# Patient Record
Sex: MALE | Race: WHITE | NOT HISPANIC OR LATINO | Employment: OTHER | URBAN - METROPOLITAN AREA
[De-identification: names, ages, dates, MRNs, and addresses within clinical notes are randomized per-mention and may not be internally consistent; named-entity substitution may affect disease eponyms.]

---

## 2017-02-02 ENCOUNTER — GENERIC CONVERSION - ENCOUNTER (OUTPATIENT)
Dept: OTHER | Facility: OTHER | Age: 77
End: 2017-02-02

## 2017-02-16 ENCOUNTER — GENERIC CONVERSION - ENCOUNTER (OUTPATIENT)
Dept: OTHER | Facility: OTHER | Age: 77
End: 2017-02-16

## 2017-03-21 ENCOUNTER — ALLSCRIPTS OFFICE VISIT (OUTPATIENT)
Dept: OTHER | Facility: OTHER | Age: 77
End: 2017-03-21

## 2017-03-21 DIAGNOSIS — I10 ESSENTIAL (PRIMARY) HYPERTENSION: ICD-10-CM

## 2017-03-21 DIAGNOSIS — I48.20 CHRONIC ATRIAL FIBRILLATION (HCC): ICD-10-CM

## 2017-03-21 DIAGNOSIS — Z00.00 ENCOUNTER FOR GENERAL ADULT MEDICAL EXAMINATION WITHOUT ABNORMAL FINDINGS: ICD-10-CM

## 2017-03-21 DIAGNOSIS — E11.9 TYPE 2 DIABETES MELLITUS WITHOUT COMPLICATIONS (HCC): ICD-10-CM

## 2017-03-22 ENCOUNTER — TRANSCRIBE ORDERS (OUTPATIENT)
Dept: ADMINISTRATIVE | Facility: HOSPITAL | Age: 77
End: 2017-03-22

## 2017-03-22 DIAGNOSIS — R06.83 SNORES: Primary | ICD-10-CM

## 2017-03-28 ENCOUNTER — GENERIC CONVERSION - ENCOUNTER (OUTPATIENT)
Dept: OTHER | Facility: OTHER | Age: 77
End: 2017-03-28

## 2017-04-17 ENCOUNTER — HOSPITAL ENCOUNTER (OUTPATIENT)
Dept: NON INVASIVE DIAGNOSTICS | Facility: HOSPITAL | Age: 77
Discharge: HOME | End: 2017-04-17
Payer: MEDICARE

## 2017-04-17 DIAGNOSIS — E11.9 TYPE 2 DIABETES MELLITUS WITHOUT COMPLICATIONS (HCC): ICD-10-CM

## 2017-04-17 DIAGNOSIS — I10 ESSENTIAL (PRIMARY) HYPERTENSION: ICD-10-CM

## 2017-04-17 DIAGNOSIS — I48.20 CHRONIC ATRIAL FIBRILLATION (HCC): ICD-10-CM

## 2017-04-17 DIAGNOSIS — Z00.00 ENCOUNTER FOR GENERAL ADULT MEDICAL EXAMINATION WITHOUT ABNORMAL FINDINGS: ICD-10-CM

## 2017-04-17 PROCEDURE — 93225 XTRNL ECG REC<48 HRS REC: CPT

## 2017-04-17 PROCEDURE — 93226 XTRNL ECG REC<48 HR SCAN A/R: CPT

## 2017-04-18 ENCOUNTER — GENERIC CONVERSION - ENCOUNTER (OUTPATIENT)
Dept: OTHER | Facility: OTHER | Age: 77
End: 2017-04-18

## 2017-04-18 ENCOUNTER — HOSPITAL ENCOUNTER (OUTPATIENT)
Dept: SLEEP CENTER | Facility: CLINIC | Age: 77
Discharge: HOME | End: 2017-04-18
Payer: MEDICARE

## 2017-04-18 DIAGNOSIS — R06.83 SNORES: ICD-10-CM

## 2017-04-21 ENCOUNTER — ALLSCRIPTS OFFICE VISIT (OUTPATIENT)
Dept: OTHER | Facility: OTHER | Age: 77
End: 2017-04-21

## 2018-01-12 VITALS
SYSTOLIC BLOOD PRESSURE: 126 MMHG | HEART RATE: 100 BPM | BODY MASS INDEX: 27.77 KG/M2 | WEIGHT: 205.06 LBS | DIASTOLIC BLOOD PRESSURE: 78 MMHG | HEIGHT: 72 IN

## 2018-01-13 VITALS
BODY MASS INDEX: 27.94 KG/M2 | WEIGHT: 206.31 LBS | SYSTOLIC BLOOD PRESSURE: 122 MMHG | HEIGHT: 72 IN | HEART RATE: 78 BPM | DIASTOLIC BLOOD PRESSURE: 88 MMHG

## 2018-02-03 DIAGNOSIS — I48.91 A-FIB (HCC): Primary | ICD-10-CM

## 2018-05-15 RX ORDER — AMOXICILLIN 500 MG
CAPSULE ORAL
COMMUNITY
Start: 2016-04-19 | End: 2020-05-27 | Stop reason: ALTCHOICE

## 2018-05-15 RX ORDER — FENOFIBRATE 160 MG/1
1 TABLET ORAL DAILY
COMMUNITY
Start: 2017-04-21

## 2018-05-15 RX ORDER — LISINOPRIL 5 MG/1
1 TABLET ORAL DAILY
COMMUNITY
Start: 2016-04-19

## 2018-05-15 RX ORDER — ROSUVASTATIN CALCIUM 10 MG/1
1 TABLET, COATED ORAL DAILY
COMMUNITY
Start: 2016-04-19 | End: 2018-05-16 | Stop reason: ALTCHOICE

## 2018-05-15 RX ORDER — WARFARIN SODIUM 2.5 MG/1
TABLET ORAL
COMMUNITY
Start: 2016-04-19

## 2018-05-15 RX ORDER — DIGOXIN 125 MCG
1 TABLET ORAL DAILY
COMMUNITY
Start: 2016-04-19

## 2018-05-15 RX ORDER — ZINC OXIDE 13 %
CREAM (GRAM) TOPICAL
COMMUNITY
Start: 2016-04-19 | End: 2018-05-16 | Stop reason: ALTCHOICE

## 2018-05-15 RX ORDER — MULTIVITAMIN
1 TABLET ORAL DAILY
COMMUNITY
Start: 2016-04-19

## 2018-05-16 ENCOUNTER — PROCEDURE VISIT (OUTPATIENT)
Dept: CARDIOLOGY CLINIC | Facility: CLINIC | Age: 78
End: 2018-05-16
Payer: MEDICARE

## 2018-05-16 ENCOUNTER — OFFICE VISIT (OUTPATIENT)
Dept: CARDIOLOGY CLINIC | Facility: CLINIC | Age: 78
End: 2018-05-16
Payer: MEDICARE

## 2018-05-16 VITALS
RESPIRATION RATE: 16 BRPM | DIASTOLIC BLOOD PRESSURE: 90 MMHG | SYSTOLIC BLOOD PRESSURE: 140 MMHG | HEIGHT: 72 IN | WEIGHT: 208.4 LBS | HEART RATE: 86 BPM | BODY MASS INDEX: 28.23 KG/M2

## 2018-05-16 DIAGNOSIS — I10 ESSENTIAL HYPERTENSION: ICD-10-CM

## 2018-05-16 DIAGNOSIS — Z79.01 ANTICOAGULATED: ICD-10-CM

## 2018-05-16 DIAGNOSIS — I48.19 PERSISTENT ATRIAL FIBRILLATION (HCC): Primary | Chronic | ICD-10-CM

## 2018-05-16 DIAGNOSIS — I48.91 ATRIAL FIBRILLATION, UNSPECIFIED TYPE (HCC): Primary | ICD-10-CM

## 2018-05-16 PROCEDURE — 99214 OFFICE O/P EST MOD 30 MIN: CPT | Performed by: INTERNAL MEDICINE

## 2018-05-16 PROCEDURE — 93000 ELECTROCARDIOGRAM COMPLETE: CPT | Performed by: INTERNAL MEDICINE

## 2018-05-16 RX ORDER — ATORVASTATIN CALCIUM 10 MG/1
10 TABLET, FILM COATED ORAL
COMMUNITY
Start: 2018-01-18

## 2018-05-16 RX ORDER — GLIMEPIRIDE 1 MG/1
0.5 TABLET ORAL
COMMUNITY
Start: 2018-04-04

## 2018-05-16 RX ORDER — GLIMEPIRIDE 2 MG/1
TABLET ORAL
COMMUNITY
Start: 2018-02-07 | End: 2018-05-16 | Stop reason: ALTCHOICE

## 2018-05-16 NOTE — LETTER
May 16, 2018     Nidhi Fraser MD  [address]  [facility]  [facility] [address], [address] [id number]    Patient: Weston Guzman   YOB: 1940   Date of Visit: 5/16/2018       Dear Dr Desmond Brand: Thank you for referring Weston Guzman to me for evaluation  Below are my notes for this consultation  If you have questions, please do not hesitate to call me  I look forward to following your patient along with you  Sincerely,        Abena Medellin MD        CC: No Recipients  Abena Medellin MD  5/16/2018  3:52 PM  Sign at close encounter  [address] [facility]    Outpatient Follow-up  Today's Date: 05/16/18        Patient name: Weston Guzman  YOB: 1940  Sex: male         Chief Complaint: f/u afib      ASSESSMENT:  Problem List Items Addressed This Visit     None      Visit Diagnoses     Atrial fibrillation, unspecified type ([facility] [address] )    -  Primary    Relevant Medications    digoxin (LANOXIN) 0 125 mg tablet    Other Relevant Orders    POCT ECG    Holter monitor - 24 hour    Holter monitor - 24 hour          79 yo male1)Afib/persistent w Tachy-mervin w permanent afib x18 years  HOlter jan 2017shows average HR 95bpm but it was snow day and he shoveled for 2 days  Shows bradycardai at night w multiple pauses 36 longes 2 5s and lowest rate 48bpm  We repeated holter with adding 25mg Metoprolol in am and average rate 83bpm and there was 2 3s pause but rates mostly 50s at night  EF 60% on repeat TTE  2) Anticoagualtion [id number] on coumadin  3) Mild MATTHEW opted against CPAP      PLAN:  1 holter today  2   Check TTE w f/u w DR Desmnod Brand       Follow up in: 12 months    Orders Placed This Encounter   Procedures    Holter monitor - 24 hour    Holter monitor - 24 hour    POCT ECG     Medications Discontinued During This Encounter   Medication Reason    glimepiride (AMARYL) 2 mg tablet Alternate therapy  rosuvastatin (CRESTOR) 10 MG tablet Alternate therapy    Probiotic Product (PROBIOTIC DAILY) CAPS Therapy completed             HPI/Subjective:   79 yo male1)Afib/persistent w Tachy-mervin w permanent afib x18 years  HOlter jan 2017shows average HR 95bpm but it was snow day and he shoveled for 2 days  Shows bradycardai at night w multiple pauses 36 longes 2 5s and lowest rate 48bpm  We repeated holter with adding 25mg Metoprolol in am and average rate 83bpm and there was 2 3s pause but rates mostly 50s at night  EF 60% on repeat TTE  2) Anticoagualtion [id number] on coumadin  3) Mild MATTHEW opted against CPAP    Feels good, no dizzyness/syncope  Mild SOB on moderate exertion  No edema  Please note HPI is listed by problem with with update following it, it is copied again in the assessment above and reflects medical decision making as well  Complete 12 point ROS reviewed and otherwise non pertinent or negative except as per HPI pertinent positives in Cardiovascular and Respiratory emphasized  Please see paper chart for outpatient clinic patients where the patient completed the 12 point ROS survey  No past medical history on file  Allergies   Allergen Reactions    Glimepiride      RACING HEART /ANXIETY    Levofloxacin      I reviewed the Home Medication list and Allergies in the chart     Scheduled Meds:  Current Outpatient Prescriptions   Medication Sig Dispense Refill    atorvastatin (LIPITOR) 10 mg tablet Take 10 mg by mouth      digoxin (LANOXIN) 0 125 mg tablet Take 1 tablet by mouth daily      fenofibrate (TRIGLIDE) 160 MG tablet Take 1 tablet by mouth daily      glimepiride (AMARYL) 1 mg tablet Take 0 5 mg by mouth daily with breakfast        lisinopril (ZESTRIL) 5 mg tablet Take 1 tablet by mouth daily      metoprolol tartrate (LOPRESSOR) 25 mg tablet TAKE 1 TABLET BY MOUTH  DAILY 90 tablet 3    Multiple Vitamin (MULTI-VITAMIN DAILY) TABS Take 1 tablet by mouth daily      Omega-3 Fatty Acids (FISH OIL) 1200 MG CAPS Take by mouth      sitaGLIPtin (JANUVIA) 50 mg tablet Take 1 tablet by mouth daily      warfarin (COUMADIN) 2 5 mg tablet Take by mouth       No current facility-administered medications for this visit  PRN Meds:  No family history on file  Social History     Social History    Marital status: /Civil Union     Spouse name: N/A    Number of children: N/A    Years of education: N/A     Occupational History    Not on file  Social History Main Topics    Smoking status: Former Smoker    Smokeless tobacco: Never Used    Alcohol use Not on file    Drug use: Unknown    Sexual activity: Not on file     Other Topics Concern    Not on file     Social History Narrative    No narrative on file         OBJECTIVE:    /90   Pulse 86   Resp 16   Ht 6' (1 829 m)   Wt 94 5 kg (208 lb 6 4 oz)   BMI 28 26 kg/m²    Vitals:    05/16/18 1503   Weight: 94 5 kg (208 lb 6 4 oz)     GEN: No acute distress, Alert and oriented, well appearing  HEENT:Head, neck, ears, oral pharynx: Mucus membranes moist, oral pharynx clear, nares clear  External ears normal  EYES: Pupils equal, sclera anicteric, midline, normal conjuctiva  NECK: No JVD, supple, no obvious masses or thryomegaly or goiter  CARDIOVASCULAR: irreg irreg, No murmur, rub, gallops S1,S2  LUNGS: Clear To auscultation bilaterally, normal effort, no rales, rhonchi, crackles  ABDOMEN:  nondistended,  without obvious organomegaly or ascites  EXTREMITIES/VASCULAR:  No edema  Radial pulses intact, pedal pulses difficult to palpate, warm an well perfused  PSYCH: Normal Affect, no overt suicidal ideation, linear speech pattern without evidence of psychosis     NEURO: Grossly intact, moving all extremiteis equal, face symmetric, alert and responsive, no obvious focal defecits  GAIT:  Ambulates normally without difficulty  HEME: No bleeding, bruising, petechia, purpura  SKIN: No significant rashes, warm, no diaphoresis or pallor  Lab Results:       LABS:      Chemistry    No results found for: NA, K, CL, CO2, BUN, CREATININE, GLU No results found for: CALCIUM, ALKPHOS, AST, ALT, BILITOT         No results found for: CHOL  No results found for: HDL  No results found for: LDLCALC  No results found for: TRIG  No components found for: CHOLHDL    IMAGING: No results found       Cardiac testing:                 I reviewed and interpreted the following LABS/EKG/TELE/IMAGING and below is summary of my interpretation (if data available):      Current EKG and Rhythm Strip: Afib contrlled rate 86bpm, 1 3s ause

## 2018-05-16 NOTE — PROGRESS NOTES
HEART AND [address]     Outpatient Follow-up  Today's Date: 05/16/18        Patient name: Rolando Garay  YOB: 1940  Sex: male         Chief Complaint: f/u afib      ASSESSMENT:  Problem List Items Addressed This Visit     None      Visit Diagnoses     Atrial fibrillation, unspecified type ([address] )    -  Primary    Relevant Medications    digoxin (LANOXIN) 0 125 mg tablet    Other Relevant Orders    POCT ECG    Holter monitor - 24 hour    Holter monitor - 24 hour          79 yo male1)Afib/persistent w Tachy-mervin w permanent afib x18 years  HOlter jan 2017shows average HR 95bpm but it was snow day and he shoveled for 2 days  Shows bradycardai at night w multiple pauses 36 longes 2 5s and lowest rate 48bpm  We repeated holter with adding 25mg Metoprolol in am and average rate 83bpm and there was 2 3s pause but rates mostly 50s at night  EF 60% on repeat TTE  2) Anticoagualtion [id number] on coumadin  3) Mild MATTHEW opted against CPAP      PLAN:  1 holter today  2  Check TTE w f/u w DR Ronyn Bae       Follow up in: 12 months    Orders Placed This Encounter   Procedures    Holter monitor - 24 hour    Holter monitor - 24 hour    POCT ECG     Medications Discontinued During This Encounter   Medication Reason    glimepiride (AMARYL) 2 mg tablet Alternate therapy    rosuvastatin (CRESTOR) 10 MG tablet Alternate therapy    Probiotic Product (PROBIOTIC DAILY) CAPS Therapy completed             HPI/Subjective:   79 yo male1)Afib/persistent w Tachy-mervin w permanent afib x18 years  HOlter jan 2017shows average HR 95bpm but it was snow day and he shoveled for 2 days   Shows bradycardai at night w multiple pauses 36 longes 2 5s and lowest rate 48bpm  We repeated holter with adding 25mg Metoprolol in am and average rate 83bpm and there was 2 3s pause but rates mostly 50s at night  EF 60% on repeat TTE  2) Anticoagualtion [id number] on coumadin  3) Mild MATTHEW opted against CPAP    Feels good, no dizzyness/syncope  Mild SOB on moderate exertion  No edema  Please note HPI is listed by problem with with update following it, it is copied again in the assessment above and reflects medical decision making as well  Complete 12 point ROS reviewed and otherwise non pertinent or negative except as per HPI pertinent positives in Cardiovascular and Respiratory emphasized  Please see paper chart for outpatient clinic patients where the patient completed the 12 point ROS survey  No past medical history on file  Allergies   Allergen Reactions    Glimepiride      RACING HEART /ANXIETY    Levofloxacin      I reviewed the Home Medication list and Allergies in the chart  Scheduled Meds:  Current Outpatient Prescriptions   Medication Sig Dispense Refill    atorvastatin (LIPITOR) 10 mg tablet Take 10 mg by mouth      digoxin (LANOXIN) 0 125 mg tablet Take 1 tablet by mouth daily      fenofibrate (TRIGLIDE) 160 MG tablet Take 1 tablet by mouth daily      glimepiride (AMARYL) 1 mg tablet Take 0 5 mg by mouth daily with breakfast        lisinopril (ZESTRIL) 5 mg tablet Take 1 tablet by mouth daily      metoprolol tartrate (LOPRESSOR) 25 mg tablet TAKE 1 TABLET BY MOUTH  DAILY 90 tablet 3    Multiple Vitamin (MULTI-VITAMIN DAILY) TABS Take 1 tablet by mouth daily      Omega-3 Fatty Acids (FISH OIL) 1200 MG CAPS Take by mouth      sitaGLIPtin (JANUVIA) 50 mg tablet Take 1 tablet by mouth daily      warfarin (COUMADIN) 2 5 mg tablet Take by mouth       No current facility-administered medications for this visit  PRN Meds:  No family history on file      Social History     Social History    Marital status: /Civil Union     Spouse name: N/A    Number of children: N/A    Years of education: N/A     Occupational History    Not on file      Social History Main Topics    Smoking status: Former Smoker    Smokeless tobacco: Never Used    Alcohol use Not on file    Drug use: Unknown    Sexual activity: Not on file     Other Topics Concern    Not on file     Social History Narrative    No narrative on file         OBJECTIVE:    /90   Pulse 86   Resp 16   Ht 6' (1 829 m)   Wt 94 5 kg (208 lb 6 4 oz)   BMI 28 26 kg/m²   Vitals:    05/16/18 1503   Weight: 94 5 kg (208 lb 6 4 oz)     GEN: No acute distress, Alert and oriented, well appearing  HEENT:Head, neck, ears, oral pharynx: Mucus membranes moist, oral pharynx clear, nares clear  External ears normal  EYES: Pupils equal, sclera anicteric, midline, normal conjuctiva  NECK: No JVD, supple, no obvious masses or thryomegaly or goiter  CARDIOVASCULAR: irreg irreg, No murmur, rub, gallops S1,S2  LUNGS: Clear To auscultation bilaterally, normal effort, no rales, rhonchi, crackles  ABDOMEN:  nondistended,  without obvious organomegaly or ascites  EXTREMITIES/VASCULAR:  No edema  Radial pulses intact, pedal pulses difficult to palpate, warm an well perfused  PSYCH: Normal Affect, no overt suicidal ideation, linear speech pattern without evidence of psychosis  NEURO: Grossly intact, moving all extremiteis equal, face symmetric, alert and responsive, no obvious focal defecits  GAIT:  Ambulates normally without difficulty  HEME: No bleeding, bruising, petechia, purpura  SKIN: No significant rashes, warm, no diaphoresis or pallor  Lab Results:       LABS:      Chemistry    No results found for: NA, K, CL, CO2, BUN, CREATININE, GLU No results found for: CALCIUM, ALKPHOS, AST, ALT, BILITOT         No results found for: CHOL  No results found for: HDL  No results found for: LDLCALC  No results found for: TRIG  No components found for: CHOLHDL    IMAGING: No results found       Cardiac testing:                 I reviewed and interpreted the following LABS/EKG/TELE/IMAGING and below is summary of my interpretation (if data available):      Current EKG and Rhythm Strip: Afib contrlled rate 86bpm, 1 3s ause

## 2018-05-18 ENCOUNTER — HOSPITAL ENCOUNTER (OUTPATIENT)
Dept: NON INVASIVE DIAGNOSTICS | Facility: HOSPITAL | Age: 78
Discharge: HOME | End: 2018-05-18
Payer: MEDICARE

## 2018-05-18 DIAGNOSIS — I48.19 PERSISTENT ATRIAL FIBRILLATION (HCC): Chronic | ICD-10-CM

## 2018-05-18 PROCEDURE — 93226 XTRNL ECG REC<48 HR SCAN A/R: CPT

## 2018-05-18 PROCEDURE — 93225 XTRNL ECG REC<48 HRS REC: CPT

## 2018-05-18 PROCEDURE — 93224 XTRNL ECG REC UP TO 48 HRS: CPT | Performed by: INTERNAL MEDICINE

## 2019-03-24 DIAGNOSIS — I48.91 A-FIB (HCC): ICD-10-CM

## 2019-05-15 ENCOUNTER — TELEPHONE (OUTPATIENT)
Dept: CARDIOLOGY CLINIC | Facility: CLINIC | Age: 79
End: 2019-05-15

## 2019-05-15 DIAGNOSIS — I48.19 PERSISTENT ATRIAL FIBRILLATION (HCC): Primary | Chronic | ICD-10-CM

## 2019-06-03 ENCOUNTER — HOSPITAL ENCOUNTER (OUTPATIENT)
Dept: NON INVASIVE DIAGNOSTICS | Facility: HOSPITAL | Age: 79
Discharge: HOME | End: 2019-06-03
Attending: INTERNAL MEDICINE
Payer: MEDICARE

## 2019-06-03 DIAGNOSIS — I48.19 PERSISTENT ATRIAL FIBRILLATION (HCC): ICD-10-CM

## 2019-06-03 PROCEDURE — 93226 XTRNL ECG REC<48 HR SCAN A/R: CPT

## 2019-06-03 PROCEDURE — 93225 XTRNL ECG REC<48 HRS REC: CPT

## 2019-06-05 PROCEDURE — 93227 XTRNL ECG REC<48 HR R&I: CPT | Performed by: INTERNAL MEDICINE

## 2019-06-12 ENCOUNTER — OFFICE VISIT (OUTPATIENT)
Dept: CARDIOLOGY CLINIC | Facility: CLINIC | Age: 79
End: 2019-06-12
Payer: MEDICARE

## 2019-06-12 VITALS
DIASTOLIC BLOOD PRESSURE: 90 MMHG | WEIGHT: 208.4 LBS | BODY MASS INDEX: 28.23 KG/M2 | HEART RATE: 76 BPM | SYSTOLIC BLOOD PRESSURE: 136 MMHG | HEIGHT: 72 IN

## 2019-06-12 DIAGNOSIS — I48.91 ATRIAL FIBRILLATION, UNSPECIFIED TYPE (HCC): Primary | ICD-10-CM

## 2019-06-12 PROCEDURE — 93000 ELECTROCARDIOGRAM COMPLETE: CPT | Performed by: INTERNAL MEDICINE

## 2019-06-12 PROCEDURE — 99213 OFFICE O/P EST LOW 20 MIN: CPT | Performed by: INTERNAL MEDICINE

## 2020-01-28 DIAGNOSIS — I48.91 A-FIB (HCC): ICD-10-CM

## 2020-05-19 DIAGNOSIS — I48.91 A-FIB (HCC): ICD-10-CM

## 2020-05-27 ENCOUNTER — TELEPHONE (OUTPATIENT)
Dept: CARDIOLOGY CLINIC | Facility: CLINIC | Age: 80
End: 2020-05-27

## 2020-05-27 ENCOUNTER — OFFICE VISIT (OUTPATIENT)
Dept: CARDIOLOGY CLINIC | Facility: CLINIC | Age: 80
End: 2020-05-27
Payer: MEDICARE

## 2020-05-27 VITALS — HEART RATE: 65 BPM | DIASTOLIC BLOOD PRESSURE: 80 MMHG | SYSTOLIC BLOOD PRESSURE: 142 MMHG

## 2020-05-27 DIAGNOSIS — I48.91 ATRIAL FIBRILLATION, UNSPECIFIED TYPE (HCC): Primary | ICD-10-CM

## 2020-05-27 PROCEDURE — 99213 OFFICE O/P EST LOW 20 MIN: CPT | Performed by: INTERNAL MEDICINE

## 2020-05-27 PROCEDURE — 93000 ELECTROCARDIOGRAM COMPLETE: CPT | Performed by: INTERNAL MEDICINE

## 2020-08-25 DIAGNOSIS — I48.91 A-FIB (HCC): ICD-10-CM

## 2021-05-05 ENCOUNTER — OFFICE VISIT (OUTPATIENT)
Dept: CARDIOLOGY CLINIC | Facility: CLINIC | Age: 81
End: 2021-05-05
Payer: MEDICARE

## 2021-05-05 VITALS
SYSTOLIC BLOOD PRESSURE: 116 MMHG | HEIGHT: 72 IN | WEIGHT: 203 LBS | DIASTOLIC BLOOD PRESSURE: 82 MMHG | BODY MASS INDEX: 27.5 KG/M2

## 2021-05-05 DIAGNOSIS — I48.91 ATRIAL FIBRILLATION, UNSPECIFIED TYPE (HCC): Primary | Chronic | ICD-10-CM

## 2021-05-05 PROCEDURE — 99214 OFFICE O/P EST MOD 30 MIN: CPT | Performed by: INTERNAL MEDICINE

## 2021-05-05 PROCEDURE — 93000 ELECTROCARDIOGRAM COMPLETE: CPT | Performed by: INTERNAL MEDICINE

## 2021-09-20 DIAGNOSIS — I48.91 A-FIB (HCC): ICD-10-CM

## 2022-03-25 ENCOUNTER — ESTABLISHED COMPREHENSIVE EXAM (OUTPATIENT)
Dept: URBAN - METROPOLITAN AREA CLINIC 6 | Facility: CLINIC | Age: 82
End: 2022-03-25

## 2022-03-25 DIAGNOSIS — H40.023: ICD-10-CM

## 2022-03-25 DIAGNOSIS — E11.9: ICD-10-CM

## 2022-03-25 PROCEDURE — 92133 CPTRZD OPH DX IMG PST SGM ON: CPT

## 2022-03-25 PROCEDURE — 92014 COMPRE OPH EXAM EST PT 1/>: CPT

## 2022-03-25 ASSESSMENT — VISUAL ACUITY
OD_SC: 20/30-2
OS_SC: 20/25

## 2022-03-25 ASSESSMENT — TONOMETRY
OS_IOP_MMHG: 21
OD_IOP_MMHG: 21

## 2022-08-26 ENCOUNTER — PROBLEM (OUTPATIENT)
Dept: URBAN - METROPOLITAN AREA CLINIC 6 | Facility: CLINIC | Age: 82
End: 2022-08-26

## 2022-08-26 DIAGNOSIS — H10.022: ICD-10-CM

## 2022-08-26 PROCEDURE — 92012 INTRM OPH EXAM EST PATIENT: CPT

## 2022-08-26 ASSESSMENT — TONOMETRY
OD_IOP_MMHG: 19
OS_IOP_MMHG: 19

## 2022-08-26 ASSESSMENT — VISUAL ACUITY
OS_SC: 20/25
OD_SC: 20/25-2

## 2022-09-19 DIAGNOSIS — I48.91 A-FIB (HCC): ICD-10-CM

## 2022-11-16 ENCOUNTER — OFFICE VISIT (OUTPATIENT)
Dept: CARDIOLOGY CLINIC | Facility: CLINIC | Age: 82
End: 2022-11-16

## 2022-11-16 VITALS
SYSTOLIC BLOOD PRESSURE: 112 MMHG | BODY MASS INDEX: 27.22 KG/M2 | WEIGHT: 201 LBS | DIASTOLIC BLOOD PRESSURE: 80 MMHG | HEIGHT: 72 IN | HEART RATE: 66 BPM

## 2022-11-16 DIAGNOSIS — I48.91 ATRIAL FIBRILLATION, UNSPECIFIED TYPE (HCC): Primary | ICD-10-CM

## 2022-11-16 NOTE — PROGRESS NOTES
HEART AND VASCULAR  CARDIAC [address]    Outpatient Follow-up  Today's Date: 11/16/22        Patient name: Rebecca Mock  YOB: 1940  Sex: male         Chief Complaint: f/u afib      ASSESSMENT:  Problem List Items Addressed This Visit        Cardiovascular and Mediastinum    Atrial fibrillation ([address] ) - Primary (Chronic)    Relevant Orders    POCT ECG       [de-identified] yo male      1)Afib/persistent w Tachy-mervin w permanent afib over20 years  Had zio patch showing adequate rate control, no pauses  He is asymptmatic  Still very active  HOlter jan 2017shows average HR 95bpm but it was snow day and he shoveled for 2 days  Shows bradycardai at night w multiple pauses 36 longes 2 5s and lowest rate 48bpm  We repeated holter with adding 25mg Metoprolol in am and average rate 83bpm and there was 2 3s pause but rates mostly 50s at night  Repeat holter May 2019 looks same, longest pause 2 9s  EF 60% on repeat TTE 2018 and 2021  Repeat TTE 2022 w Dr Christian Adkins was reportedly normal    Heart monitor also showed similar to past, rate controlled afib no pauses  2) Anticoagualtion [id number] on coumadin  3) Mild MATTHEW opted against CPAP            PLAN:  1  Cont warfarin very well controlled  2  Continue current regimen w metop/dig  Follow up in: 12 months    Orders Placed This Encounter   Procedures   • POCT ECG     There are no discontinued medications    HPI/Subjective:   [de-identified] yo male      1)Afib/persistent w Tachy-mervin w permanent afib over20 years  Had zio patch showing adequate rate control, no pauses  He is asymptmatic  Still very active  HOlter jan 2017shows average HR 95bpm but it was snow day and he shoveled for 2 days   Shows bradycardai at night w multiple pauses 36 longes 2 5s and lowest rate 48bpm  We repeated holter with adding 25mg Metoprolol in am and average rate 83bpm and there was 2 3s pause but rates mostly 50s at night  Repeat holter May 2019 looks same, longest pause 2 9s  EF 60% on repeat TTE 2018 and 2021  Repeat TTE 2022 w Dr Beavers Members was reportedly normal    Heart monitor also showed similar to past, rate controlled afib no pauses  2) Anticoagualtion [id number] on coumadin  3) Mild MATTHEW opted against CPAP    Tamara Stai is doing well, no complaints, still active, no cp/sob/edema/palpitations/syncope/fatigue/dizzyness        Please note HPI is listed by problem with with update following it, it is copied again in the assessment above and reflects medical decision making as well  Complete 12 point ROS reviewed and otherwise non pertinent or negative except as per HPI  Please see paper chart for outpatient clinic patients where the patient completed the 12 point ROS survey  History reviewed  No pertinent past medical history  Allergies   Allergen Reactions   • Glimepiride      RACING HEART /ANXIETY   • Levofloxacin      I reviewed the Home Medication list and Allergies in the chart  Scheduled Meds:  Current Outpatient Medications   Medication Sig Dispense Refill   • atorvastatin (LIPITOR) 10 mg tablet Take 10 mg by mouth     • digoxin (LANOXIN) 0 125 mg tablet Take 1 tablet by mouth daily     • fenofibrate (TRIGLIDE) 160 MG tablet Take 1 tablet by mouth daily     • glimepiride (AMARYL) 1 mg tablet Take 0 5 mg by mouth daily with breakfast       • lisinopril (ZESTRIL) 5 mg tablet Take 1 tablet by mouth daily     • metoprolol tartrate (LOPRESSOR) 25 mg tablet TAKE 1 TABLET BY MOUTH  DAILY 90 tablet 0   • Multiple Vitamin (MULTI-VITAMIN DAILY) TABS Take 1 tablet by mouth daily     • sitaGLIPtin (JANUVIA) 50 mg tablet Take 1 tablet by mouth daily     • warfarin (COUMADIN) 2 5 mg tablet Take by mouth       No current facility-administered medications for this visit  PRN Meds:  History reviewed   No pertinent family history  Social History     Socioeconomic History   • Marital status: /Civil Union     Spouse name: Not on file   • Number of children: Not on file   • Years of education: Not on file   • Highest education level: Not on file   Occupational History   • Not on file   Tobacco Use   • Smoking status: Former   • Smokeless tobacco: Never   • Tobacco comments:     quit about 25 yrs ago    Substance and Sexual Activity   • Alcohol use: Yes   • Drug use: Not Currently   • Sexual activity: Not on file   Other Topics Concern   • Not on file   Social History Narrative   • Not on file     Social Determinants of Health     Financial Resource Strain: Not on file   Food Insecurity: Not on file   Transportation Needs: Not on file   Physical Activity: Not on file   Stress: Not on file   Social Connections: Not on file   Intimate Partner Violence: Not on file   Housing Stability: Not on file         OBJECTIVE:      /80 (BP Location: Left arm, Patient Position: Sitting, Cuff Size: Standard)   Pulse 66   Ht 6' (1 829 m)   Wt 91 2 kg (201 lb)   BMI 27 26 kg/m²   Vitals:    11/16/22 1352   Weight: 91 2 kg (201 lb)     /80 (BP Location: Left arm, Patient Position: Sitting, Cuff Size: Standard)   Pulse 66   Ht 6' (1 829 m)   Wt 91 2 kg (201 lb)   BMI 27 26 kg/m²   Vitals:    11/16/22 1352   Weight: 91 2 kg (201 lb)     GEN: No acute distress, Alert and oriented, well appearing  HEENT:External ears normal, wearing a mask  EYES: Pupils equal, sclera anicteric, midline, normal conjuctiva  NECK: No JVD, supple, no obvious masses or thryomegaly or goiter  CARDIOVASCULAR:  irreg irreg, No murmur, rub, gallops S1,S2  LUNGS: Clear To auscultation bilaterally, normal effort, no rales, rhonchi, crackles   ABDOMEN:  nondistended,  without obvious organomegaly or ascites  EXTREMITIES/VASCULAR:  No edema  warm an well perfused  PSYCH: Normal Affect,  linear speech pattern without evidence of psychosis     NEURO: Grossly intact, moving all extremiteis equal, face symmetric, alert and responsive, no obvious focal defecits   GAIT:  Ambulates normally without difficulty  HEME: No bleeding, bruising, petechia, purpura   SKIN: No significant rashes on visibile skin, warm, no diaphoresis or pallor  Lab Results:       LABS:      Chemistry    No results found for: NA, K, CL, CO2, BUN, CREATININE No results found for: CALCIUM, ALKPHOS, AST, ALT, BILITOT         No results found for: CHOL  No results found for: HDL  No results found for: LDLCALC  No results found for: TRIG  No results found for: CHOLHDL    IMAGING: No results found  Cardiac testing:         I reviewed and interpreted the following LABS/EKG/TELE/IMAGING and below is summary of my interpretation (if data available):    Reviewed outpatient monitor March 2022: rate controlled afib, no signficant pauses, or high rates       Current EKG Afib HR 66bpm

## 2022-11-20 DIAGNOSIS — I48.91 A-FIB (HCC): ICD-10-CM

## 2023-05-10 ENCOUNTER — ESTABLISHED COMPREHENSIVE EXAM (OUTPATIENT)
Dept: URBAN - METROPOLITAN AREA CLINIC 6 | Facility: CLINIC | Age: 83
End: 2023-05-10

## 2023-05-10 DIAGNOSIS — H04.123: ICD-10-CM

## 2023-05-10 DIAGNOSIS — E11.9: ICD-10-CM

## 2023-05-10 PROCEDURE — 92014 COMPRE OPH EXAM EST PT 1/>: CPT

## 2023-05-10 ASSESSMENT — TONOMETRY
OD_IOP_MMHG: 19
OS_IOP_MMHG: 20

## 2023-05-10 ASSESSMENT — VISUAL ACUITY
OS_SC: 20/25
OD_SC: 20/25

## 2023-06-29 ENCOUNTER — PROBLEM (OUTPATIENT)
Dept: URBAN - METROPOLITAN AREA CLINIC 6 | Facility: CLINIC | Age: 83
End: 2023-06-29

## 2023-06-29 DIAGNOSIS — H11.31: ICD-10-CM

## 2023-06-29 PROCEDURE — 92012 INTRM OPH EXAM EST PATIENT: CPT

## 2023-06-29 ASSESSMENT — TONOMETRY
OS_IOP_MMHG: 22
OD_IOP_MMHG: 21

## 2023-06-29 ASSESSMENT — VISUAL ACUITY
OD_SC: 20/30
OS_SC: 20/30+2

## 2023-10-13 LAB — HBA1C MFR BLD HPLC: 7.6 %

## 2023-12-08 ENCOUNTER — OFFICE VISIT (OUTPATIENT)
Dept: CARDIOLOGY CLINIC | Facility: CLINIC | Age: 83
End: 2023-12-08
Payer: MEDICARE

## 2023-12-08 VITALS
HEIGHT: 72 IN | WEIGHT: 199.3 LBS | HEART RATE: 81 BPM | DIASTOLIC BLOOD PRESSURE: 74 MMHG | BODY MASS INDEX: 26.99 KG/M2 | SYSTOLIC BLOOD PRESSURE: 106 MMHG

## 2023-12-08 DIAGNOSIS — I48.91 ATRIAL FIBRILLATION, UNSPECIFIED TYPE (HCC): Primary | ICD-10-CM

## 2023-12-08 PROCEDURE — 99214 OFFICE O/P EST MOD 30 MIN: CPT | Performed by: INTERNAL MEDICINE

## 2023-12-08 PROCEDURE — 93000 ELECTROCARDIOGRAM COMPLETE: CPT | Performed by: INTERNAL MEDICINE

## 2023-12-08 RX ORDER — DILTIAZEM HYDROCHLORIDE 120 MG/1
120 CAPSULE, EXTENDED RELEASE ORAL DAILY
Qty: 90 CAPSULE | Refills: 3 | Status: SHIPPED | OUTPATIENT
Start: 2023-12-08 | End: 2023-12-08 | Stop reason: SDUPTHER

## 2023-12-08 RX ORDER — DILTIAZEM HYDROCHLORIDE 180 MG/1
180 CAPSULE, EXTENDED RELEASE ORAL DAILY
Qty: 90 CAPSULE | Refills: 3 | Status: SHIPPED | OUTPATIENT
Start: 2023-12-08

## 2023-12-08 NOTE — PROGRESS NOTES
HEART AND VASCULAR  CARDIAC ELECTROPHYSIOLOGY   HEART RHYTHM CENTER  [facility]    Outpatient Follow-up  Today's Date: 12/08/23        Patient name: Mervin Holt  YOB: 1940  Sex: male         Chief Complaint: f/u afib      ASSESSMENT:  Problem List Items Addressed This Visit          Cardiovascular and Mediastinum    Atrial fibrillation ([address]) - Primary (Chronic)    Relevant Medications    diltiazem (DILACOR XR) 180 MG 24 hr capsule    Other Relevant Orders    POCT ECG    AMB extended holter monitor       81 yo male      1)Afib/persistent w Tachy-mervin w permanent afib over20 years. Had zio patch showing adequate rate control,  on metop 25mg am and digoxin 125 He is asymptmatic. Still very active. Bradycardai at night w multiple pauses 36 longes 2.5s and lowest rate 48bpm.  EF 60% TTE 2022 w Dr Katherene Lundborg was reportedly normal.       2) Anticoagualtion [id number] on coumadin  3) Mild MATTHEW opted against CPAP  4) CKD Cr 2.1    Dig level was low. PLAN:  Stop digoxin given CKD, will try doing DIltiazem 180mg montherapy. Check 2 week Zio, first week is current regiment (metop/dig) second week with Diltaizem. He will follow HR at home too. 2. Cont warfarin very well controlled      Follow up in: 3 months    Orders Placed This Encounter   Procedures    AMB extended holter monitor    POCT ECG     Medications Discontinued During This Encounter   Medication Reason    digoxin (LANOXIN) 0.125 mg tablet     metoprolol tartrate (LOPRESSOR) 25 mg tablet     diltiazem (DILACOR XR) 120 MG 24 hr capsule Reorder         . ............................................................................................ HPI/Subjective:   81 yo male with persistent afib, here for follow up. Continues to be very active, no SOB, no palpitations, no dizzyness, no syncope.      Allergies   Allergen Reactions    Glimepiride Anxiety and Palpitations    Levofloxacin Other (See Comments)     Unknown reaction Pseudoephedrine Hyperactivity     I reviewed the Home Medication list and Allergies in the chart. Scheduled Meds:  Current Outpatient Medications   Medication Sig Dispense Refill    atorvastatin (LIPITOR) 10 mg tablet Take 10 mg by mouth      diltiazem (DILACOR XR) 180 MG 24 hr capsule Take 1 capsule (180 mg total) by mouth daily 90 capsule 3    fenofibrate (TRIGLIDE) 160 MG tablet Take 1 tablet by mouth daily      glimepiride (AMARYL) 1 mg tablet Take 0.5 mg by mouth daily with breakfast        lisinopril (ZESTRIL) 5 mg tablet Take 1 tablet by mouth daily      Multiple Vitamin (MULTI-VITAMIN DAILY) TABS Take 1 tablet by mouth daily      sitaGLIPtin (JANUVIA) 50 mg tablet Take 1 tablet by mouth daily      warfarin (COUMADIN) 2.5 mg tablet Take by mouth       No current facility-administered medications for this visit. PRN Meds:. History reviewed. No pertinent family history.     Social History     Socioeconomic History    Marital status: /Civil Union     Spouse name: Not on file    Number of children: Not on file    Years of education: Not on file    Highest education level: Not on file   Occupational History    Not on file   Tobacco Use    Smoking status: Former    Smokeless tobacco: Never    Tobacco comments:     quit about 25 yrs ago    Substance and Sexual Activity    Alcohol use: Yes    Drug use: Not Currently    Sexual activity: Not on file   Other Topics Concern    Not on file   Social History Narrative    Not on file     Social Determinants of Health     Financial Resource Strain: Not on file   Food Insecurity: Not on file   Transportation Needs: Not on file   Physical Activity: Not on file   Stress: Not on file   Social Connections: Not on file   Intimate Partner Violence: Not on file   Housing Stability: Not on file         OBJECTIVE:      /74 (BP Location: Left arm, Patient Position: Sitting, Cuff Size: Standard)   Pulse 81   Ht 6' (1.829 m)   Wt 90.4 kg (199 lb 4.8 oz) BMI 27.03 kg/m²   Vitals:    12/08/23 1440   Weight: 90.4 kg (199 lb 4.8 oz)     /74 (BP Location: Left arm, Patient Position: Sitting, Cuff Size: Standard)   Pulse 81   Ht 6' (1.829 m)   Wt 90.4 kg (199 lb 4.8 oz)   BMI 27.03 kg/m²   Vitals:    12/08/23 1440   Weight: 90.4 kg (199 lb 4.8 oz)     GEN: No acute distress, Alert and oriented, well appearing  HEENT:External ears normal, wearing a mask. EYES: Pupils equal, sclera anicteric, midline, normal conjuctiva  NECK: No JVD, supple, no obvious masses or thryomegaly or goiter  CARDIOVASCULAR:  irreg irreg, No murmur, rub, gallops S1,S2  LUNGS: Clear To auscultation bilaterally, normal effort, no rales, rhonchi, crackles   ABDOMEN:  nondistended,  without obvious organomegaly or ascites  EXTREMITIES/VASCULAR:  No edema. warm an well perfused. PSYCH: Normal Affect,  linear speech pattern without evidence of psychosis. NEURO: Grossly intact, moving all extremiteis equal, face symmetric, alert and responsive, no obvious focal defecits   GAIT:  Ambulates normally without difficulty  HEME: No bleeding, bruising, petechia, purpura   SKIN: No significant rashes on visibile skin, warm, no diaphoresis or pallor. Lab Results:       LABS:      Chemistry    No results found for: "NA", "K", "CL", "CO2", "BUN", "CREATININE" No results found for: "CALCIUM", "ALKPHOS", "AST", "ALT", "BILITOT"         No results found for: "CHOL"  No results found for: "HDL"  No results found for: "LDLCALC"  No results found for: "TRIG"  No results found for: "CHOLHDL"    IMAGING: No results found. Cardiac testing:         I reviewed and interpreted the following LABS/EKG/TELE/IMAGING and below is summary of my interpretation (if data available):    Reviewed outpatient monitor March 2022: rate controlled afib, no signficant pauses, or high rates .     Current EKG Afib HR 66bpm

## 2023-12-29 ENCOUNTER — CLINICAL SUPPORT (OUTPATIENT)
Dept: CARDIOLOGY CLINIC | Facility: CLINIC | Age: 83
End: 2023-12-29
Payer: MEDICARE

## 2023-12-29 DIAGNOSIS — I48.91 ATRIAL FIBRILLATION, UNSPECIFIED TYPE (HCC): ICD-10-CM

## 2023-12-29 PROCEDURE — 93248 EXT ECG>7D<15D REV&INTERPJ: CPT | Performed by: INTERNAL MEDICINE

## 2024-01-02 ENCOUNTER — TELEPHONE (OUTPATIENT)
Dept: CARDIOLOGY CLINIC | Facility: CLINIC | Age: 84
End: 2024-01-02

## 2024-01-02 NOTE — RESULT ENCOUNTER NOTE
1 run of Ventricular Tachycardia occurred lasting 8 beats with a max rate  of 146 bpm (avg 134 bpm). Atrial Fibrillation occurred continuously (100%  burden), ranging from  bpm (avg of 87 bpm). Isolated VEs were rare  (<1.0%), VE Couplets were rare (<1.0%), and no VE Triplets were present.    Agree with above findings. Rate controlled afib, no pauses    Please let Rafi know Zio  holter looks very good. Do not recommend changes at this time with current medications.

## 2024-01-02 NOTE — TELEPHONE ENCOUNTER
----- Message from Preston Solomon MD sent at 1/2/2024  1:10 PM EST -----  1 run of Ventricular Tachycardia occurred lasting 8 beats with a max rate  of 146 bpm (avg 134 bpm). Atrial Fibrillation occurred continuously (100%  burden), ranging from  bpm (avg of 87 bpm). Isolated VEs were rare  (<1.0%), VE Couplets were rare (<1.0%), and no VE Triplets were present.    Agree with above findings. Rate controlled afib, no pauses    Please let Rafi know Zio  holter looks very good. Do not recommend changes at this time with current medications.

## 2024-01-08 ENCOUNTER — TELEPHONE (OUTPATIENT)
Dept: CARDIOLOGY CLINIC | Facility: CLINIC | Age: 84
End: 2024-01-08

## 2024-01-08 DIAGNOSIS — I48.91 ATRIAL FIBRILLATION, UNSPECIFIED TYPE (HCC): ICD-10-CM

## 2024-01-08 RX ORDER — DILTIAZEM HYDROCHLORIDE 240 MG/1
240 CAPSULE, EXTENDED RELEASE ORAL DAILY
Qty: 30 CAPSULE | Refills: 5 | Status: SHIPPED | OUTPATIENT
Start: 2024-01-08 | End: 2024-01-15 | Stop reason: SDUPTHER

## 2024-01-08 NOTE — TELEPHONE ENCOUNTER
Spoke with patient.  He is agreeable to trying the 240mg dosing of diltiazem.  He will call office to update.

## 2024-01-08 NOTE — TELEPHONE ENCOUNTER
Patient called, states 2-3 days ago he had several hours of heart racing and some last night and a little this morning.  I did instruct him to take his diltiazem 180mg as he had not yet today.    You had ordered a Zio patch before stopping his metoprolol and digoxin, then the second half of the monitor was just on diltiazem 180mg daily.      He really has felt well until these episodes a few days ago.  Patient does not have a device and cannot send an EKG.      He is overall feeling ok.  Do you want a Zio perhaps? Or any increase in diltiazem?      He also mentioned that while cutting down tall plants in the woods this weekend he experience a lot of belching which was relieve with Tums.  He also sees Dr. Tomlin.

## 2024-01-09 ENCOUNTER — ESTABLISHED COMPREHENSIVE EXAM (OUTPATIENT)
Dept: URBAN - METROPOLITAN AREA CLINIC 6 | Facility: CLINIC | Age: 84
End: 2024-01-09

## 2024-01-09 DIAGNOSIS — E11.9: ICD-10-CM

## 2024-01-09 DIAGNOSIS — Z96.1: ICD-10-CM

## 2024-01-09 DIAGNOSIS — H04.123: ICD-10-CM

## 2024-01-09 PROCEDURE — 92014 COMPRE OPH EXAM EST PT 1/>: CPT

## 2024-01-09 ASSESSMENT — TONOMETRY
OS_IOP_MMHG: 18
OD_IOP_MMHG: 18

## 2024-01-09 ASSESSMENT — VISUAL ACUITY
OS_SC: 20/20-2
OD_SC: 20/20

## 2024-01-10 NOTE — TELEPHONE ENCOUNTER
"Patient called, states he felt \"funny\" 2 hours after he took the increased dose of diltiazem. Reported BP of 124/86 and Pulse 87 on BP machine and 68 on pulse oximeter.      He did take his lisinopril along with the increased dose so I suggested possibly that he stagger the meds by 2 hours tomorrow and let me know how he feels.  Also suggested may take a few days to adjust to the new dose.      He is going to call me tomorrow.  "

## 2024-01-15 DIAGNOSIS — I48.91 ATRIAL FIBRILLATION, UNSPECIFIED TYPE (HCC): ICD-10-CM

## 2024-01-15 RX ORDER — DILTIAZEM HYDROCHLORIDE 240 MG/1
240 CAPSULE, EXTENDED RELEASE ORAL DAILY
Qty: 90 CAPSULE | Refills: 3 | Status: SHIPPED | OUTPATIENT
Start: 2024-01-15

## 2024-01-30 NOTE — PROGRESS NOTES
HEART AND VASCULAR  CARDIAC [address]    Outpatient Follow-up  Today's Date: 05/05/21        Patient name: Manpreet Tao  YOB: 1940  Sex: male         Chief Complaint: f/u afib      ASSESSMENT:  Problem List Items Addressed This Visit        Cardiovascular and Mediastinum    Atrial fibrillation ([address] ) - Primary (Chronic)    Relevant Orders    POCT ECG          81 yo male      1)Afib/persistent w Tachy-mervin w permanent afib over20 years  Had zio patch showing adequate rate control, no pauses  He is asymptmatic  Still very active  Echo 2021 shows normal EF normal valves  HOlter jan 2017shows average HR 95bpm but it was snow day and he shoveled for 2 days  Shows bradycardai at night w multiple pauses 36 longes 2 5s and lowest rate 48bpm  We repeated holter with adding 25mg Metoprolol in am and average rate 83bpm and there was 2 3s pause but rates mostly 50s at night  Repeat holter May 2019 looks same, longest pause 2 9s  EF 60% on repeat TTE 2018  2) Anticoagualtion [id number] on coumadin  3) Mild MATTHEW opted against CPAP      PLAN:  1  Cont warfarin very well controlled  2  Continue current regimen w metop/dig  Follow up in: 12 months    Orders Placed This Encounter   Procedures    POCT ECG     There are no discontinued medications    HPI/Subjective:     81 yo male      1)Afib/persistent w Tachy-mervin w permanent afib over20 years  Had zio patch showing adequate rate control, no pauses  He is asymptmatic  Still very active  Echo 2021 shows normal EF normal valves  HOlter jan 2017shows average HR 95bpm but it was snow day and he shoveled for 2 days   Shows bradycardai at night w multiple pauses 36 longes 2 5s and lowest rate 48bpm  We repeated holter with adding 25mg Metoprolol in am and average rate 83bpm and there was 2 3s pause but rates mostly 50s at night  Repeat holter May 2019 looks same, longest pause 2 9s  EF 60% on repeat TTE 2018  2) Anticoagualtion [id number] on coumadin  3) Mild MATTHEW opted against CPAP    Feels very good, no sob, palpitations  Please note HPI is listed by problem with with update following it, it is copied again in the assessment above and reflects medical decision making as well  Complete 12 point ROS reviewed and otherwise non pertinent or negative except as per HPI  Please see paper chart for outpatient clinic patients where the patient completed the 12 point ROS survey  No past medical history on file  Allergies   Allergen Reactions    Glimepiride      RACING HEART /ANXIETY    Levofloxacin      I reviewed the Home Medication list and Allergies in the chart  Scheduled Meds:  Current Outpatient Medications   Medication Sig Dispense Refill    atorvastatin (LIPITOR) 10 mg tablet Take 10 mg by mouth      digoxin (LANOXIN) 0 125 mg tablet Take 1 tablet by mouth daily      fenofibrate (TRIGLIDE) 160 MG tablet Take 1 tablet by mouth daily      glimepiride (AMARYL) 1 mg tablet Take 0 5 mg by mouth daily with breakfast        lisinopril (ZESTRIL) 5 mg tablet Take 1 tablet by mouth daily      metoprolol tartrate (LOPRESSOR) 25 mg tablet TAKE 1 TABLET BY MOUTH  DAILY 90 tablet 3    Multiple Vitamin (MULTI-VITAMIN DAILY) TABS Take 1 tablet by mouth daily      sitaGLIPtin (JANUVIA) 50 mg tablet Take 1 tablet by mouth daily      warfarin (COUMADIN) 2 5 mg tablet Take by mouth       No current facility-administered medications for this visit  PRN Meds:  No family history on file      Social History     Socioeconomic History    Marital status: /Civil Union     Spouse name: Not on file    Number of children: Not on file    Years of education: Not on file    Highest education level: Not on file   Occupational History    Not on file   Social Needs    Financial resource strain: Not on file    Food insecurity     Worry: Not on file     Inability: Not on file    Transportation needs     Medical: Not on file     Non-medical: Not on file   Tobacco Use    Smoking status: Former Smoker    Smokeless tobacco: Never Used    Tobacco comment: quit about 25 yrs ago    Substance and Sexual Activity    Alcohol use: Yes     Frequency: 4 or more times a week     Drinks per session: 1 or 2    Drug use: Not Currently    Sexual activity: Not on file   Lifestyle    Physical activity     Days per week: Not on file     Minutes per session: Not on file    Stress: Not on file   Relationships    Social connections     Talks on phone: Not on file     Gets together: Not on file     Attends Worship service: Not on file     Active member of club or organization: Not on file     Attends meetings of clubs or organizations: Not on file     Relationship status: Not on file    Intimate partner violence     Fear of current or ex partner: Not on file     Emotionally abused: Not on file     Physically abused: Not on file     Forced sexual activity: Not on file   Other Topics Concern    Not on file   Social History Narrative    Not on file         OBJECTIVE:      /82 (BP Location: Left arm, Patient Position: Sitting, Cuff Size: Adult)   Ht 6' (1 829 m)   Wt 92 1 kg (203 lb)   BMI 27 53 kg/m²   Vitals:    05/05/21 1314   Weight: 92 1 kg (203 lb)     GEN: No acute distress, Alert and oriented, well appearing  HEENT:External ears normal, wearing a mask  EYES: Pupils equal, sclera anicteric, midline, normal conjuctiva  NECK: No JVD, supple, no obvious masses or thryomegaly or goiter  CARDIOVASCULAR: irreg irreg No murmur, rub, gallops S1,S2  LUNGS: Clear To auscultation bilaterally, normal effort, no rales, rhonchi, crackles   ABDOMEN:  nondistended,  without obvious organomegaly or ascites  EXTREMITIES/VASCULAR:  No edema  warm an well perfused    PSYCH: Normal Affect,  linear speech pattern without evidence of psychosis  NEURO: Grossly intact, moving all extremiteis equal, face symmetric, alert and responsive, no obvious focal defecits   GAIT:  Ambulates normally without difficulty  HEME: No bleeding, bruising, petechia, purpura   SKIN: No significant rashes on visibile skin, warm, no diaphoresis or pallor  Lab Results:       LABS:      Chemistry    No results found for: NA, K, CL, CO2, BUN, CREATININE No results found for: CALCIUM, ALKPHOS, AST, ALT, BILITOT         No results found for: CHOL  No results found for: HDL  No results found for: LDLCALC  No results found for: TRIG  No results found for: CHOLHDL    IMAGING: No results found       Cardiac testing:                 I reviewed and interpreted the following LABS/EKG/TELE/IMAGING and below is summary of my interpretation (if data available):      Current EKG and Rhythm Strip:2019 Afib contrlled rate 60bpm  Zio  HOlter reviewed HR mostly normal 50s at night up to 120s w ativity, no signfificant events Warm/Dry

## 2024-03-07 ENCOUNTER — OFFICE VISIT (OUTPATIENT)
Dept: CARDIOLOGY CLINIC | Facility: CLINIC | Age: 84
End: 2024-03-07
Payer: MEDICARE

## 2024-03-07 VITALS
SYSTOLIC BLOOD PRESSURE: 114 MMHG | BODY MASS INDEX: 26.82 KG/M2 | HEIGHT: 72 IN | WEIGHT: 198 LBS | HEART RATE: 88 BPM | DIASTOLIC BLOOD PRESSURE: 78 MMHG

## 2024-03-07 DIAGNOSIS — N18.31 TYPE 2 DIABETES MELLITUS WITH STAGE 3A CHRONIC KIDNEY DISEASE, WITHOUT LONG-TERM CURRENT USE OF INSULIN (HCC): ICD-10-CM

## 2024-03-07 DIAGNOSIS — E78.5 DYSLIPIDEMIA ASSOCIATED WITH TYPE 2 DIABETES MELLITUS: ICD-10-CM

## 2024-03-07 DIAGNOSIS — E11.69 DYSLIPIDEMIA ASSOCIATED WITH TYPE 2 DIABETES MELLITUS: ICD-10-CM

## 2024-03-07 DIAGNOSIS — N18.32 STAGE 3B CHRONIC KIDNEY DISEASE (HCC): ICD-10-CM

## 2024-03-07 DIAGNOSIS — I48.91 ATRIAL FIBRILLATION, UNSPECIFIED TYPE (HCC): Primary | ICD-10-CM

## 2024-03-07 DIAGNOSIS — E11.22 TYPE 2 DIABETES MELLITUS WITH STAGE 3A CHRONIC KIDNEY DISEASE, WITHOUT LONG-TERM CURRENT USE OF INSULIN (HCC): ICD-10-CM

## 2024-03-07 PROCEDURE — 99214 OFFICE O/P EST MOD 30 MIN: CPT | Performed by: INTERNAL MEDICINE

## 2024-03-07 PROCEDURE — 93000 ELECTROCARDIOGRAM COMPLETE: CPT | Performed by: INTERNAL MEDICINE

## 2024-03-07 RX ORDER — DILTIAZEM HYDROCHLORIDE 120 MG/1
120 CAPSULE, EXTENDED RELEASE ORAL 2 TIMES DAILY
Qty: 60 CAPSULE | Refills: 11 | Status: SHIPPED | OUTPATIENT
Start: 2024-03-07

## 2024-03-07 NOTE — PROGRESS NOTES
HEART AND VASCULAR  CARDIAC ELECTROPHYSIOLOGY   HEART RHYTHM CENTER  [facility]    Outpatient Follow-up  Today's Date: 03/07/24        Patient name: Rafi Mcguire  YOB: 1940  Sex: male         Chief Complaint: f/u afib      ASSESSMENT:  Problem List Items Addressed This Visit          Endocrine    Dyslipidemia associated with type 2 diabetes mellitus     Type 2 diabetes mellitus with stage 3a chronic kidney disease, without long-term current use of insulin (HCC)       Cardiovascular and Mediastinum    Atrial fibrillation (HCC) - Primary (Chronic)    Relevant Medications    diltiazem (DILACOR XR) 120 MG 24 hr capsule    Other Relevant Orders    POCT ECG       Genitourinary    Stage 3b chronic kidney disease (HCC)       85 yo male      1)Afib/persistent w Tachy-mervin w permanent afib over20 years. Had zio patch showing adequate rate control,  on metop 25mg am and digoxin 125 He is asymptmatic. Still very active. Bradycardai at night w multiple pauses 36 longes 2.5s and lowest rate 48bpm.  EF 60% TTE 2022 w Dr Tomlin was reportedly normal.   We stopped metop/and dig and went with diltaizem given his renal insufficiency didn't want to continue with dig.      2) Anticoagualtion [id number] on coumadin  3) Mild MATTHEW opted against CPAP  4) CKD Cr 2.1    Dig level was low.       PLAN:  Cont dilt. Feels afib less controlled in morning before wakes up so will change to 120mg po bid of long acting  F/u 6 months    Follow up in: 3 months    Orders Placed This Encounter   Procedures    POCT ECG     Medications Discontinued During This Encounter   Medication Reason    diltiazem (DILACOR XR) 240 MG 24 hr capsule          .............................................................................................    HPI/Subjective:     85 yo male      1)Afib/persistent w Tachy-mervin w permanent afib over20 years. Had zio patch showing adequate rate control,  on metop 25mg am and digoxin 125 He  is asymptmatic. Still very active. Bradycardai at night w multiple pauses 36 longes 2.5s and lowest rate 48bpm.  EF 60% TTE 2022 w Dr Tomlin was reportedly normal.   We stopped metop/and dig and went with diltaizem given his renal insufficiency didn't want to continue with dig.      2) Anticoagualtion [id number] on coumadin  3) Mild MATTHEW opted against CPAP  4) CKD Cr 2.1    Dig level was low.       Allergies   Allergen Reactions    Glimepiride Anxiety and Palpitations    Levofloxacin Other (See Comments)     Unknown reaction    Pseudoephedrine Hyperactivity     I reviewed the Home Medication list and Allergies in the chart.   Scheduled Meds:  Current Outpatient Medications   Medication Sig Dispense Refill    atorvastatin (LIPITOR) 10 mg tablet Take 10 mg by mouth      diltiazem (DILACOR XR) 120 MG 24 hr capsule Take 1 capsule (120 mg total) by mouth 2 (two) times a day 60 capsule 11    fenofibrate (TRIGLIDE) 160 MG tablet Take 1 tablet by mouth daily      glimepiride (AMARYL) 1 mg tablet Take 0.5 mg by mouth daily with breakfast        lisinopril (ZESTRIL) 5 mg tablet Take 1 tablet by mouth daily      Multiple Vitamin (MULTI-VITAMIN DAILY) TABS Take 1 tablet by mouth daily      sitaGLIPtin (JANUVIA) 50 mg tablet Take 1 tablet by mouth daily      warfarin (COUMADIN) 2.5 mg tablet Take by mouth       No current facility-administered medications for this visit.     PRN Meds:.        No family history on file.    Social History     Socioeconomic History    Marital status: /Civil Union     Spouse name: Not on file    Number of children: Not on file    Years of education: Not on file    Highest education level: Not on file   Occupational History    Not on file   Tobacco Use    Smoking status: Former    Smokeless tobacco: Never    Tobacco comments:     quit about 25 yrs ago    Substance and Sexual Activity    Alcohol use: Yes     Comment: 1 drink a day, 5 days a week    Drug use: Not Currently    Sexual activity:  Not on file   Other Topics Concern    Not on file   Social History Narrative    Not on file     Social Determinants of Health     Financial Resource Strain: Not on file   Food Insecurity: Not on file   Transportation Needs: Not on file   Physical Activity: Not on file   Stress: Not on file   Social Connections: Not on file   Intimate Partner Violence: Not on file   Housing Stability: Not on file         OBJECTIVE:      /78 (BP Location: Left arm, Patient Position: Sitting, Cuff Size: Standard)   Pulse 88   Ht 6' (1.829 m)   Wt 89.8 kg (198 lb)   BMI 26.85 kg/m²   Vitals:    03/07/24 1339   Weight: 89.8 kg (198 lb)     /78 (BP Location: Left arm, Patient Position: Sitting, Cuff Size: Standard)   Pulse 88   Ht 6' (1.829 m)   Wt 89.8 kg (198 lb)   BMI 26.85 kg/m²   Vitals:    03/07/24 1339   Weight: 89.8 kg (198 lb)     GEN: No acute distress, Alert and oriented, well appearing  HEENT:External ears normal, wearing a mask.   EYES: Pupils equal, sclera anicteric, midline, normal conjuctiva  NECK: No JVD, supple, no obvious masses or thryomegaly or goiter  CARDIOVASCULAR:  irreg irreg, No murmur, rub, gallops S1,S2  LUNGS: Clear To auscultation bilaterally, normal effort, no rales, rhonchi, crackles   ABDOMEN:  nondistended,  without obvious organomegaly or ascites  EXTREMITIES/VASCULAR:  No edema. warm an well perfused.  PSYCH: Normal Affect,  linear speech pattern without evidence of psychosis.   NEURO: Grossly intact, moving all extremiteis equal, face symmetric, alert and responsive, no obvious focal defecits   GAIT:  Ambulates normally without difficulty  HEME: No bleeding, bruising, petechia, purpura   SKIN: No significant rashes on visibile skin, warm, no diaphoresis or pallor.       Lab Results:       LABS:      Chemistry        Component Value Date/Time    K 4.3 01/26/2024 1407     01/26/2024 1407    CO2 22 01/26/2024 1407    BUN 27 01/26/2024 1407    CREATININE 2.05 (H) 01/26/2024 1407  "       Component Value Date/Time    CALCIUM 10.0 01/26/2024 1407    ALKPHOS 31 (L) 10/13/2023 1039    AST 27 10/13/2023 1039    ALT 25 10/13/2023 1039            No results found for: \"CHOL\"  No results found for: \"HDL\"  No results found for: \"LDLCALC\"  No results found for: \"TRIG\"  No results found for: \"CHOLHDL\"    IMAGING: No results found.     Cardiac testing:         I reviewed and interpreted the following LABS/EKG/TELE/IMAGING and below is summary of my interpretation (if data available):    Reviewed outpatient monitor March 2022: rate controlled afib, no signficant pauses, or high rates .    Current EKG Afib HR 88bpm  "

## 2024-05-14 DIAGNOSIS — I48.91 ATRIAL FIBRILLATION, UNSPECIFIED TYPE (HCC): ICD-10-CM

## 2024-05-14 NOTE — TELEPHONE ENCOUNTER
Pt contacted Call Center requested refill of their medication.        Doctor Name: Dr Solomon       Medication Name: diltiazem (Dilacor)      Dosage of Med: 120 mg 24 hour capsule     Frequency of Med: Take 1 capsule (120 mg total) by mouth 2 (two) times a day.      Remaining Medication: 2 weeks       Pharmacy and Location: [facility], [place], [place]        Pt. Preferred Callback Phone Number: [phone]      Thank you.    *pt is requesting 90 day supply*       PLEASE ADVISE PATIENTS:    REFILL REQUESTS WILL BE PROCESSED WITHIN 24-48 HOURS.

## 2024-05-15 DIAGNOSIS — I48.91 ATRIAL FIBRILLATION, UNSPECIFIED TYPE (HCC): ICD-10-CM

## 2024-05-15 RX ORDER — DILTIAZEM HYDROCHLORIDE 120 MG/1
120 CAPSULE, EXTENDED RELEASE ORAL 2 TIMES DAILY
Qty: 180 CAPSULE | Refills: 1 | Status: SHIPPED | OUTPATIENT
Start: 2024-05-15

## 2024-05-15 RX ORDER — DILTIAZEM HYDROCHLORIDE 120 MG/1
120 CAPSULE, EXTENDED RELEASE ORAL 2 TIMES DAILY
Qty: 60 CAPSULE | Refills: 5 | Status: SHIPPED | OUTPATIENT
Start: 2024-05-15 | End: 2024-05-15 | Stop reason: SDUPTHER

## 2024-05-15 NOTE — TELEPHONE ENCOUNTER
Patient requested this be sent to [facility] and it got sent to [facility].   PLEASE SEND TO [facility].    Reason for call:   [x] Refill   [] Prior Auth  [] Other:     Office:   [] PCP/Provider -   [x] Specialty/Provider - Cardiology    Medication: Dilitiazem    Dose/Frequency: 120 mg 24 hr capsule taken by mouth 2x daily     Quantity: 180    Pharmacy: [facility] - [place], [place] - [address] [phone]     Does the patient have enough for 3 days?   [] Yes   [x] No - Send as HP to [facility]

## 2024-09-03 ENCOUNTER — OFFICE VISIT (OUTPATIENT)
Dept: CARDIOLOGY CLINIC | Facility: CLINIC | Age: 84
End: 2024-09-03
Payer: MEDICARE

## 2024-09-03 VITALS
WEIGHT: 198.5 LBS | SYSTOLIC BLOOD PRESSURE: 106 MMHG | BODY MASS INDEX: 26.89 KG/M2 | HEART RATE: 76 BPM | HEIGHT: 72 IN | DIASTOLIC BLOOD PRESSURE: 64 MMHG

## 2024-09-03 DIAGNOSIS — I48.91 ATRIAL FIBRILLATION, UNSPECIFIED TYPE (HCC): Primary | ICD-10-CM

## 2024-09-03 PROCEDURE — 93000 ELECTROCARDIOGRAM COMPLETE: CPT | Performed by: INTERNAL MEDICINE

## 2024-09-03 PROCEDURE — 99213 OFFICE O/P EST LOW 20 MIN: CPT | Performed by: INTERNAL MEDICINE

## 2024-09-03 RX ORDER — DILTIAZEM HYDROCHLORIDE 120 MG/1
120 CAPSULE, EXTENDED RELEASE ORAL 2 TIMES DAILY
Qty: 180 CAPSULE | Refills: 1 | Status: SHIPPED | OUTPATIENT
Start: 2024-09-03 | End: 2024-09-03

## 2024-09-03 RX ORDER — DILTIAZEM HYDROCHLORIDE 120 MG/1
120 CAPSULE, COATED, EXTENDED RELEASE ORAL 2 TIMES DAILY
Qty: 180 CAPSULE | Refills: 3 | Status: SHIPPED | OUTPATIENT
Start: 2024-09-03 | End: 2024-09-04

## 2024-09-03 NOTE — PROGRESS NOTES
HEART AND VASCULAR  CARDIAC ELECTROPHYSIOLOGY   HEART RHYTHM CENTER  [facility]    Outpatient Follow-up  Today's Date: 09/03/24        Patient name: Rafi Mcguire  YOB: 1940  Sex: male         Chief Complaint: f/u afib      ASSESSMENT:  Problem List Items Addressed This Visit          Cardiovascular and Mediastinum    Atrial fibrillation (HCC) - Primary (Chronic)    Relevant Orders    POCT ECG       83 yo male      1)Afib/persistent w Tachy-mervin w permanent afib over20 years. Had zio patch showing adequate rate control, now on dilti 120mg bid (was on metop/dig) asymptomatic with multiple pauses 36 longes 2.5s and lowest rate 48bpm.  EF 60% TTE 2022 w Dr Tomlin was reportedly normal.   We stopped metop/and dig and went with diltaizem given his renal insufficiency didn't want to continue with dig.      2) Anticoagualtion [id number] on coumadin  3) Mild MATTHEW opted against CPAP  4) CKD Cr 2.1    Dig level was low.       PLAN:  Cont dilt long acting 120mg bid.  Doing well with that.   He is upset about cost, will see if cheaper option.   F/u 6 months    Follow up in: 3 months    Orders Placed This Encounter   Procedures    POCT ECG     There are no discontinued medications.        .............................................................................................    HPI/Subjective:   83 yo male      1)Afib/persistent w Tachy-mervin w permanent afib over20 years. Had zio patch showing adequate rate control, now on dilti 120mg bid (was on metop/dig) asymptomatic with multiple pauses 36 longes 2.5s and lowest rate 48bpm.  EF 60% TTE 2022 w Dr Tomlin was reportedly normal.   We stopped metop/and dig and went with diltaizem given his renal insufficiency didn't want to continue with dig.      2) Anticoagualtion [id number] on coumadin  3) Mild MATTHEW opted against CPAP  4) CKD Cr 2.1      Allergies   Allergen Reactions    Glimepiride Anxiety and Palpitations    Levofloxacin Other  (See Comments)     Unknown reaction    Pseudoephedrine Hyperactivity     I reviewed the Home Medication list and Allergies in the chart.   Scheduled Meds:  Current Outpatient Medications   Medication Sig Dispense Refill    atorvastatin (LIPITOR) 10 mg tablet Take 10 mg by mouth      diltiazem (DILACOR XR) 120 MG 24 hr capsule Take 1 capsule (120 mg total) by mouth 2 (two) times a day 180 capsule 1    fenofibrate (TRIGLIDE) 160 MG tablet Take 1 tablet by mouth daily      glimepiride (AMARYL) 1 mg tablet Take 0.5 mg by mouth daily with breakfast        lisinopril (ZESTRIL) 5 mg tablet Take 1 tablet by mouth daily      Multiple Vitamin (MULTI-VITAMIN DAILY) TABS Take 1 tablet by mouth daily      sitaGLIPtin (JANUVIA) 50 mg tablet Take 1 tablet by mouth daily      warfarin (COUMADIN) 2.5 mg tablet Take by mouth       No current facility-administered medications for this visit.     PRN Meds:.        History reviewed. No pertinent family history.    Social History     Socioeconomic History    Marital status: /Civil Union     Spouse name: Not on file    Number of children: Not on file    Years of education: Not on file    Highest education level: Not on file   Occupational History    Not on file   Tobacco Use    Smoking status: Former    Smokeless tobacco: Never    Tobacco comments:     quit about 25 yrs ago    Substance and Sexual Activity    Alcohol use: Yes     Comment: 1 drink a day, 5 days a week    Drug use: Not Currently    Sexual activity: Not on file   Other Topics Concern    Not on file   Social History Narrative    Not on file     Social Determinants of Health     Financial Resource Strain: Not on file   Food Insecurity: Not on file   Transportation Needs: Not on file   Physical Activity: Not on file   Stress: Not on file   Social Connections: Not on file   Intimate Partner Violence: Not on file   Housing Stability: Not on file         OBJECTIVE:      /64 (BP Location: Left arm, Patient Position:  "Sitting, Cuff Size: Standard)   Pulse 76   Ht 6' (1.829 m)   Wt 90 kg (198 lb 8 oz)   BMI 26.92 kg/m²   Vitals:    09/03/24 1610   Weight: 90 kg (198 lb 8 oz)     /64 (BP Location: Left arm, Patient Position: Sitting, Cuff Size: Standard)   Pulse 76   Ht 6' (1.829 m)   Wt 90 kg (198 lb 8 oz)   BMI 26.92 kg/m²   Vitals:    09/03/24 1610   Weight: 90 kg (198 lb 8 oz)     GEN: No acute distress, Alert and oriented, well appearing  HEENT:External ears normal, wearing a mask.   EYES: Pupils equal, sclera anicteric, midline, normal conjuctiva  NECK: No JVD, supple, no obvious masses or thryomegaly or goiter  CARDIOVASCULAR:  irreg irreg, No murmur, rub, gallops S1,S2  LUNGS: Clear To auscultation bilaterally, normal effort, no rales, rhonchi, crackles   ABDOMEN:  nondistended,  without obvious organomegaly or ascites  EXTREMITIES/VASCULAR:  No edema. warm an well perfused.  PSYCH: Normal Affect,  linear speech pattern without evidence of psychosis.   NEURO: Grossly intact, moving all extremiteis equal, face symmetric, alert and responsive, no obvious focal defecits   GAIT:  Ambulates normally without difficulty  HEME: No bleeding, bruising, petechia, purpura   SKIN: No significant rashes on visibile skin, warm, no diaphoresis or pallor.       Lab Results:       LABS:      Chemistry        Component Value Date/Time    K 4.5 04/10/2024 1030     04/10/2024 1030    CO2 20 (L) 04/10/2024 1030    BUN 34 (H) 04/10/2024 1030    CREATININE 2.13 (H) 04/10/2024 1030        Component Value Date/Time    CALCIUM 9.6 04/10/2024 1030    ALKPHOS 31 (L) 10/13/2023 1039    AST 28 04/10/2024 1023    ALT 20 04/10/2024 1023            No results found for: \"CHOL\"  No results found for: \"HDL\"  No results found for: \"LDLCALC\"  No results found for: \"TRIG\"  No results found for: \"CHOLHDL\"    IMAGING: No results found.     Cardiac testing:         I reviewed and interpreted the following LABS/EKG/TELE/IMAGING and below is " summary of my interpretation (if data available):    Reviewed outpatient monitor March 2022: rate controlled afib, no signficant pauses, or high rates .    Current EKG Afib HR 76bpm

## 2024-09-04 ENCOUNTER — TELEPHONE (OUTPATIENT)
Dept: CARDIOLOGY CLINIC | Facility: CLINIC | Age: 84
End: 2024-09-04

## 2024-09-04 DIAGNOSIS — I48.91 ATRIAL FIBRILLATION, UNSPECIFIED TYPE (HCC): Primary | ICD-10-CM

## 2024-09-04 RX ORDER — DILTIAZEM HYDROCHLORIDE 120 MG/1
120 CAPSULE, COATED, EXTENDED RELEASE ORAL 2 TIMES DAILY
Qty: 180 CAPSULE | Refills: 3 | Status: SHIPPED | OUTPATIENT
Start: 2024-09-04

## 2024-09-04 NOTE — TELEPHONE ENCOUNTER
Called pt. Discussed w/ him about using [facility]. Did some research and found that pt can get a 90 day supply (180 tablets) at [facility] for $31.76. Pt mentioned he typically uses [facility] pharmacy, but [facility] is not far from him. Offered to print Rx and mail to him w/ GoodRx coupon, so he can see which pharmacy would be the most affordable. Pt agreed.     Sent Rx to Dr. Solomon for approval to print and mail to pt.

## 2024-09-04 NOTE — TELEPHONE ENCOUNTER
Canceled Rx sent to mail order, [facility].     Please authorize printed Rx to be mailed to pt.    Thanks

## 2024-09-04 NOTE — TELEPHONE ENCOUNTER
Requested medication(s) are due for refill today: Yes  Patient has already received a courtesy refill: No  Other reason request has been forwarded to provider: Printed Rx needed to be mailed to pt.

## 2024-09-04 NOTE — TELEPHONE ENCOUNTER
----- Message from Preston Solomon MD sent at 9/3/2024  4:40 PM EDT -----  Patient is paying $126 for 90 days of diltiazem, wants to know if cheaper genertic. [facility].

## 2025-04-04 DIAGNOSIS — I48.91 ATRIAL FIBRILLATION, UNSPECIFIED TYPE (HCC): ICD-10-CM

## 2025-04-04 RX ORDER — DILTIAZEM HYDROCHLORIDE 120 MG/1
120 CAPSULE, COATED, EXTENDED RELEASE ORAL 2 TIMES DAILY
Qty: 180 CAPSULE | Refills: 0 | Status: SHIPPED | OUTPATIENT
Start: 2025-04-04

## 2025-04-04 NOTE — TELEPHONE ENCOUNTER
Pt has an upcoming appt with you on 04/16/25. Will provide 90 day rx with no refills in case rx changes.

## 2025-04-16 ENCOUNTER — TELEPHONE (OUTPATIENT)
Dept: NON INVASIVE DIAGNOSTICS | Facility: CLINIC | Age: 85
End: 2025-04-16

## 2025-04-16 ENCOUNTER — OFFICE VISIT (OUTPATIENT)
Dept: CARDIOLOGY CLINIC | Facility: CLINIC | Age: 85
End: 2025-04-16

## 2025-04-16 ENCOUNTER — ANTICOAG VISIT (OUTPATIENT)
Dept: CARDIOLOGY CLINIC | Facility: CLINIC | Age: 85
End: 2025-04-16

## 2025-04-16 VITALS — HEIGHT: 72 IN | BODY MASS INDEX: 26.92 KG/M2 | SYSTOLIC BLOOD PRESSURE: 138 MMHG | DIASTOLIC BLOOD PRESSURE: 88 MMHG

## 2025-04-16 DIAGNOSIS — I48.91 ATRIAL FIBRILLATION, UNSPECIFIED TYPE (HCC): Primary | Chronic | ICD-10-CM

## 2025-04-16 DIAGNOSIS — I48.91 ATRIAL FIBRILLATION, UNSPECIFIED TYPE (HCC): Primary | ICD-10-CM

## 2025-04-16 LAB — INR PPP: 2.4 (ref 0.85–1.19)

## 2025-04-16 NOTE — TELEPHONE ENCOUNTER
----- Message from Preston Solomon MD sent at 4/16/2025  1:48 PM EDT -----  Thanks can someone let him know I reviewed and looks good, same as 2022  ----- Message -----  From: Rosamaria Puckett RN  Sent: 4/16/2025   1:35 PM EDT  To: Preston Solomon MD    Results in chart  ----- Message -----  From: Preston Solomon MD  Sent: 4/16/2025  12:59 PM EDT  To: Cardiology [facility]    Can you get Echo report from [facility] ? Done last week.

## 2025-04-16 NOTE — PROGRESS NOTES
HEART AND VASCULAR  CARDIAC ELECTROPHYSIOLOGY   HEART RHYTHM CENTER  [facility]    Outpatient Follow-up  Today's Date: 04/16/25        Patient name: Rafi Mcguire  YOB: 1940  Sex: male         Chief Complaint: f/u afib      ASSESSMENT:  Problem List Items Addressed This Visit    None        84 yo male      1)Afib/persistent w Tachy-mervin w permanent afib over20 years. Had zio patch showing adequate rate control, now on dilti 120mg bid (was on metop/dig) asymptomatic with multiple pauses 36 longes 2.5s and lowest rate 48bpm.  EF 60% TTE 2022 w Dr Tomlin was reportedly normal.   Echo last week pending results.   We stopped metop/and dig and went with diltaizem given his renal insufficiency didn't want to continue with dig.      2) Anticoagualtion [id number] on coumadin  3) Mild MATTHEW opted against CPAP  4) CKD Cr 2.1        PLAN:  Cont dilt long acting 120mg bid.  Doing well with that.     Get echo results from Dr Delaney  Warfarin- change over to our clinic since Dr Delaney retiring.   F/u 6 months    No orders of the defined types were placed in this encounter.    There are no discontinued medications.        .............................................................................................    HPI/Subjective:     84 yo male      1)Afib/persistent w Tachy-mervin w permanent afib over20 years. Had zio patch showing adequate rate control, now on dilti 120mg bid (was on metop/dig) asymptomatic with multiple pauses 36 longes 2.5s and lowest rate 48bpm.  EF 60% TTE 2022 w Dr Tomlin was reportedly normal.   Echo last week pending results.   We stopped metop/and dig and went with diltaizem given his renal insufficiency didn't want to continue with dig.      2) Anticoagualtion [id number] on coumadin  3) Mild MATTHEW opted against CPAP  4) CKD Cr 2.1    Allergies   Allergen Reactions    Glimepiride Anxiety and Palpitations    Levofloxacin Other (See Comments)     Unknown  reaction    Pseudoephedrine Hyperactivity     I reviewed the Home Medication list and Allergies in the chart.   Scheduled Meds:  Current Outpatient Medications   Medication Sig Dispense Refill    atorvastatin (LIPITOR) 10 mg tablet Take 10 mg by mouth      diltiazem (CARDIZEM CD) 120 mg 24 hr capsule Take 1 capsule (120 mg total) by mouth 2 (two) times a day 180 capsule 0    fenofibrate (TRIGLIDE) 160 MG tablet Take 1 tablet by mouth daily      glimepiride (AMARYL) 1 mg tablet Take 0.5 mg by mouth daily with breakfast        lisinopril (ZESTRIL) 5 mg tablet Take 1 tablet by mouth daily      Multiple Vitamin (MULTI-VITAMIN DAILY) TABS Take 1 tablet by mouth daily      sitaGLIPtin (JANUVIA) 50 mg tablet Take 1 tablet by mouth daily      warfarin (COUMADIN) 2.5 mg tablet Take by mouth       No current facility-administered medications for this visit.     PRN Meds:.        No family history on file.    Social History     Socioeconomic History    Marital status: /Civil Union     Spouse name: Not on file    Number of children: Not on file    Years of education: Not on file    Highest education level: Not on file   Occupational History    Not on file   Tobacco Use    Smoking status: Former    Smokeless tobacco: Never    Tobacco comments:     quit about 25 yrs ago    Substance and Sexual Activity    Alcohol use: Yes     Comment: 1 drink a day, 5 days a week    Drug use: Not Currently    Sexual activity: Not on file   Other Topics Concern    Not on file   Social History Narrative    Not on file     Social Drivers of Health     Financial Resource Strain: Not on file   Food Insecurity: Not on file   Transportation Needs: Not on file   Physical Activity: Not on file   Stress: Not on file   Social Connections: Not on file   Intimate Partner Violence: Not on file   Housing Stability: Not on file         OBJECTIVE:      /88 (Patient Position: Sitting, Cuff Size: Standard)   Ht 6' (1.829 m)   BMI 26.92 kg/m²   There  "were no vitals filed for this visit.    /88 (Patient Position: Sitting, Cuff Size: Standard)   Ht 6' (1.829 m)   BMI 26.92 kg/m²   There were no vitals filed for this visit.    GEN: No acute distress, Alert and oriented, well appearing  HEENT:External ears normal, wearing a mask.   EYES: Pupils equal, sclera anicteric, midline, normal conjuctiva  NECK: No JVD, supple, no obvious masses or thryomegaly or goiter  CARDIOVASCULAR:  irreg irreg, No murmur, rub, gallops S1,S2  LUNGS: Clear To auscultation bilaterally, normal effort, no rales, rhonchi, crackles   ABDOMEN:  nondistended,  without obvious organomegaly or ascites  EXTREMITIES/VASCULAR:  No edema. warm an well perfused.  PSYCH: Normal Affect,  linear speech pattern without evidence of psychosis.   NEURO: Grossly intact, moving all extremiteis equal, face symmetric, alert and responsive, no obvious focal defecits   GAIT:  Ambulates normally without difficulty  HEME: No bleeding, bruising, petechia, purpura   SKIN: No significant rashes on visibile skin, warm, no diaphoresis or pallor.       Lab Results:       LABS:      Chemistry        Component Value Date/Time    K 4.2 02/19/2025 1406     02/19/2025 1406    CO2 23 02/19/2025 1406    BUN 29 (H) 02/19/2025 1406    CREATININE 2.15 (H) 02/19/2025 1406        Component Value Date/Time    CALCIUM 9.5 02/19/2025 1406    ALKPHOS 29 (L) 09/05/2024 0935    AST 25 10/11/2024 1006    ALT 17 10/11/2024 1006            No results found for: \"CHOL\"  No results found for: \"HDL\"  No results found for: \"LDLCALC\"  No results found for: \"TRIG\"  No results found for: \"CHOLHDL\"    IMAGING: No results found.     Cardiac testing:         I reviewed and interpreted the following LABS/EKG/TELE/IMAGING and below is summary of my interpretation (if data available):    Reviewed outpatient monitor March 2022: rate controlled afib, no signficant pauses, or high rates .    "

## 2025-04-30 ENCOUNTER — ANTICOAG VISIT (OUTPATIENT)
Dept: CARDIOLOGY CLINIC | Facility: CLINIC | Age: 85
End: 2025-04-30

## 2025-04-30 DIAGNOSIS — I48.91 ATRIAL FIBRILLATION, UNSPECIFIED TYPE (HCC): Primary | Chronic | ICD-10-CM

## 2025-06-19 DIAGNOSIS — I48.91 ATRIAL FIBRILLATION, UNSPECIFIED TYPE (HCC): ICD-10-CM

## 2025-06-19 RX ORDER — DILTIAZEM HYDROCHLORIDE 120 MG/1
120 CAPSULE, COATED, EXTENDED RELEASE ORAL 2 TIMES DAILY
Qty: 180 CAPSULE | Refills: 1 | Status: SHIPPED | OUTPATIENT
Start: 2025-06-19

## (undated) RX ORDER — OFLOXACIN 3 MG/ML: 1 SOLUTION OPHTHALMIC

## (undated) RX ORDER — LOTEPREDNOL ETABONATE 3.8 MG/G: 1 GEL OPHTHALMIC